# Patient Record
Sex: MALE | Race: BLACK OR AFRICAN AMERICAN | NOT HISPANIC OR LATINO | URBAN - METROPOLITAN AREA
[De-identification: names, ages, dates, MRNs, and addresses within clinical notes are randomized per-mention and may not be internally consistent; named-entity substitution may affect disease eponyms.]

---

## 2024-05-01 ENCOUNTER — EMERGENCY (EMERGENCY)
Facility: HOSPITAL | Age: 29
LOS: 0 days | Discharge: HOME | End: 2024-05-01
Attending: EMERGENCY MEDICINE
Payer: COMMERCIAL

## 2024-05-01 VITALS
TEMPERATURE: 98 F | WEIGHT: 190.04 LBS | HEART RATE: 73 BPM | SYSTOLIC BLOOD PRESSURE: 130 MMHG | RESPIRATION RATE: 22 BRPM | HEIGHT: 70.08 IN | OXYGEN SATURATION: 100 % | DIASTOLIC BLOOD PRESSURE: 77 MMHG

## 2024-05-01 VITALS
RESPIRATION RATE: 20 BRPM | HEART RATE: 74 BPM | SYSTOLIC BLOOD PRESSURE: 113 MMHG | OXYGEN SATURATION: 97 % | TEMPERATURE: 98 F | DIASTOLIC BLOOD PRESSURE: 66 MMHG

## 2024-05-01 DIAGNOSIS — R10.31 RIGHT LOWER QUADRANT PAIN: ICD-10-CM

## 2024-05-01 DIAGNOSIS — R39.15 URGENCY OF URINATION: ICD-10-CM

## 2024-05-01 DIAGNOSIS — N20.1 CALCULUS OF URETER: ICD-10-CM

## 2024-05-01 LAB
ALBUMIN SERPL ELPH-MCNC: 4.3 G/DL — SIGNIFICANT CHANGE UP (ref 3.3–5)
ALP SERPL-CCNC: 72 U/L — SIGNIFICANT CHANGE UP (ref 40–120)
ALT FLD-CCNC: 53 U/L — SIGNIFICANT CHANGE UP (ref 12–78)
ANION GAP SERPL CALC-SCNC: 5 MMOL/L — SIGNIFICANT CHANGE UP (ref 5–17)
APPEARANCE UR: ABNORMAL
AST SERPL-CCNC: 39 U/L — HIGH (ref 15–37)
BACTERIA # UR AUTO: ABNORMAL /HPF
BASOPHILS # BLD AUTO: 0.03 K/UL — SIGNIFICANT CHANGE UP (ref 0–0.2)
BASOPHILS NFR BLD AUTO: 0.3 % — SIGNIFICANT CHANGE UP (ref 0–2)
BILIRUB SERPL-MCNC: 0.3 MG/DL — SIGNIFICANT CHANGE UP (ref 0.2–1.2)
BILIRUB UR-MCNC: NEGATIVE — SIGNIFICANT CHANGE UP
BUN SERPL-MCNC: 12 MG/DL — SIGNIFICANT CHANGE UP (ref 7–23)
CALCIUM SERPL-MCNC: 9.6 MG/DL — SIGNIFICANT CHANGE UP (ref 8.5–10.1)
CHLORIDE SERPL-SCNC: 108 MMOL/L — SIGNIFICANT CHANGE UP (ref 96–108)
CO2 SERPL-SCNC: 27 MMOL/L — SIGNIFICANT CHANGE UP (ref 22–31)
COLOR SPEC: YELLOW — SIGNIFICANT CHANGE UP
CREAT SERPL-MCNC: 1.17 MG/DL — SIGNIFICANT CHANGE UP (ref 0.5–1.3)
DIFF PNL FLD: NEGATIVE — SIGNIFICANT CHANGE UP
EGFR: 86 ML/MIN/1.73M2 — SIGNIFICANT CHANGE UP
EOSINOPHIL # BLD AUTO: 0 K/UL — SIGNIFICANT CHANGE UP (ref 0–0.5)
EOSINOPHIL NFR BLD AUTO: 0 % — SIGNIFICANT CHANGE UP (ref 0–6)
EPI CELLS # UR: PRESENT
GLUCOSE SERPL-MCNC: 101 MG/DL — HIGH (ref 70–99)
GLUCOSE UR QL: NEGATIVE MG/DL — SIGNIFICANT CHANGE UP
HCT VFR BLD CALC: 50.6 % — HIGH (ref 39–50)
HGB BLD-MCNC: 15.8 G/DL — SIGNIFICANT CHANGE UP (ref 13–17)
IMM GRANULOCYTES NFR BLD AUTO: 0.2 % — SIGNIFICANT CHANGE UP (ref 0–0.9)
KETONES UR-MCNC: NEGATIVE MG/DL — SIGNIFICANT CHANGE UP
LACTATE SERPL-SCNC: 2.5 MMOL/L — HIGH (ref 0.7–2)
LEUKOCYTE ESTERASE UR-ACNC: NEGATIVE — SIGNIFICANT CHANGE UP
LIDOCAIN IGE QN: 24 U/L — SIGNIFICANT CHANGE UP (ref 13–75)
LYMPHOCYTES # BLD AUTO: 1.02 K/UL — SIGNIFICANT CHANGE UP (ref 1–3.3)
LYMPHOCYTES # BLD AUTO: 11.7 % — LOW (ref 13–44)
MCHC RBC-ENTMCNC: 27.4 PG — SIGNIFICANT CHANGE UP (ref 27–34)
MCHC RBC-ENTMCNC: 31.2 G/DL — LOW (ref 32–36)
MCV RBC AUTO: 87.7 FL — SIGNIFICANT CHANGE UP (ref 80–100)
MONOCYTES # BLD AUTO: 0.48 K/UL — SIGNIFICANT CHANGE UP (ref 0–0.9)
MONOCYTES NFR BLD AUTO: 5.5 % — SIGNIFICANT CHANGE UP (ref 2–14)
NEUTROPHILS # BLD AUTO: 7.15 K/UL — SIGNIFICANT CHANGE UP (ref 1.8–7.4)
NEUTROPHILS NFR BLD AUTO: 82.3 % — HIGH (ref 43–77)
NITRITE UR-MCNC: NEGATIVE — SIGNIFICANT CHANGE UP
NRBC # BLD: 0 /100 WBCS — SIGNIFICANT CHANGE UP (ref 0–0)
PH UR: 6 — SIGNIFICANT CHANGE UP (ref 5–8)
PLATELET # BLD AUTO: 158 K/UL — SIGNIFICANT CHANGE UP (ref 150–400)
POTASSIUM SERPL-MCNC: 3.8 MMOL/L — SIGNIFICANT CHANGE UP (ref 3.5–5.3)
POTASSIUM SERPL-SCNC: 3.8 MMOL/L — SIGNIFICANT CHANGE UP (ref 3.5–5.3)
PROT SERPL-MCNC: 8.7 GM/DL — HIGH (ref 6–8.3)
PROT UR-MCNC: NEGATIVE MG/DL — SIGNIFICANT CHANGE UP
RBC # BLD: 5.77 M/UL — SIGNIFICANT CHANGE UP (ref 4.2–5.8)
RBC # FLD: 12.2 % — SIGNIFICANT CHANGE UP (ref 10.3–14.5)
RBC CASTS # UR COMP ASSIST: 2 /HPF — SIGNIFICANT CHANGE UP (ref 0–4)
SODIUM SERPL-SCNC: 140 MMOL/L — SIGNIFICANT CHANGE UP (ref 135–145)
SP GR SPEC: 1.02 — SIGNIFICANT CHANGE UP (ref 1–1.03)
UROBILINOGEN FLD QL: 0.2 MG/DL — SIGNIFICANT CHANGE UP (ref 0.2–1)
WBC # BLD: 8.7 K/UL — SIGNIFICANT CHANGE UP (ref 3.8–10.5)
WBC # FLD AUTO: 8.7 K/UL — SIGNIFICANT CHANGE UP (ref 3.8–10.5)
WBC UR QL: 2 /HPF — SIGNIFICANT CHANGE UP (ref 0–5)

## 2024-05-01 PROCEDURE — 74177 CT ABD & PELVIS W/CONTRAST: CPT | Mod: 26,MC

## 2024-05-01 PROCEDURE — 99285 EMERGENCY DEPT VISIT HI MDM: CPT

## 2024-05-01 RX ORDER — IBUPROFEN 200 MG
1 TABLET ORAL
Qty: 30 | Refills: 0
Start: 2024-05-01

## 2024-05-01 RX ORDER — TAMSULOSIN HYDROCHLORIDE 0.4 MG/1
1 CAPSULE ORAL
Qty: 14 | Refills: 0
Start: 2024-05-01 | End: 2024-05-14

## 2024-05-01 RX ORDER — OXYCODONE HYDROCHLORIDE 5 MG/1
1 TABLET ORAL
Qty: 3 | Refills: 0
Start: 2024-05-01 | End: 2024-05-03

## 2024-05-01 RX ORDER — SODIUM CHLORIDE 9 MG/ML
1000 INJECTION INTRAMUSCULAR; INTRAVENOUS; SUBCUTANEOUS ONCE
Refills: 0 | Status: COMPLETED | OUTPATIENT
Start: 2024-05-01 | End: 2024-05-01

## 2024-05-01 RX ORDER — KETOROLAC TROMETHAMINE 30 MG/ML
15 SYRINGE (ML) INJECTION ONCE
Refills: 0 | Status: DISCONTINUED | OUTPATIENT
Start: 2024-05-01 | End: 2024-05-01

## 2024-05-01 RX ORDER — ACETAMINOPHEN 500 MG
1000 TABLET ORAL ONCE
Refills: 0 | Status: COMPLETED | OUTPATIENT
Start: 2024-05-01 | End: 2024-05-01

## 2024-05-01 RX ADMIN — Medication 15 MILLIGRAM(S): at 18:32

## 2024-05-01 RX ADMIN — SODIUM CHLORIDE 1000 MILLILITER(S): 9 INJECTION INTRAMUSCULAR; INTRAVENOUS; SUBCUTANEOUS at 19:25

## 2024-05-01 RX ADMIN — Medication 1000 MILLIGRAM(S): at 19:25

## 2024-05-01 RX ADMIN — Medication 15 MILLIGRAM(S): at 19:25

## 2024-05-01 RX ADMIN — Medication 400 MILLIGRAM(S): at 18:33

## 2024-05-01 RX ADMIN — SODIUM CHLORIDE 1000 MILLILITER(S): 9 INJECTION INTRAMUSCULAR; INTRAVENOUS; SUBCUTANEOUS at 18:32

## 2024-05-01 NOTE — ED PROVIDER NOTE - CLINICAL SUMMARY MEDICAL DECISION MAKING FREE TEXT BOX
Attending note (Aleks): 28-year-old male with no reported medical comorbidities presenting with worsening abdominal pain on the right side since this morning.  Is right CVA and right mid and right lower abdominal tenderness and suprapubic tenderness.  Otherwise hemodynamically stable afebrile.  Possible renal colic/ureteral stone, possible ascending urinary tract infection though this seems less likely, also possible appendicitis given location of pain.  No  complaints or physical exam findings to suggest primary testicular pathology.  Will obtain screening labs CBC CMP lipase urinalysis and screening lactate.  Will obtain CT abdomen pelvis to further evaluate.  Disposition pending review of labs and imaging, start IV fluids pain medication as needed. Attending note (Aleks): 28-year-old male with no reported medical comorbidities presenting with worsening abdominal pain on the right side since this morning.  Is right CVA and right mid and right lower abdominal tenderness and suprapubic tenderness.  Otherwise hemodynamically stable afebrile.  Possible renal colic/ureteral stone, possible ascending urinary tract infection though this seems less likely, also possible appendicitis given location of pain.  No  complaints or physical exam findings to suggest primary testicular pathology.  Will obtain screening labs CBC CMP lipase urinalysis and screening lactate.  Will obtain CT abdomen pelvis to further evaluate.  Disposition pending review of labs and imaging, start IV fluids pain medication as needed.      addendum: Patient reassessed pain resolved.  CT imaging shows 3 mm UVJ stone.  UA not consistent with infection few bacteria and squamous cells likely poor catch.  Labs reviewed no significant leukocytosis, no YOUSUF, lactate 2.5 but not concern for sepsis at this time not concern for ischemia at this time.  Given improvement diagnosis of kidney stone likely pain is related to renal colic/stone and patient stable for discharge no concern for septic stone.  Discussed results with patient and family verbalized understanding discussed medication usage including NSAIDs and oxycodone for breakthrough pain as well as Flomax nightly for passage of stone/medical expulsive therapy.  Patient verbalizes understanding (with family present as ) and is stable for discharge recommending urology follow-up.

## 2024-05-01 NOTE — ED ADULT TRIAGE NOTE - CHIEF COMPLAINT QUOTE
BIBA- from home  Abd pain N/V since this morning. Unable to urinate since this am 9am.   bright red blood in stool  denies any medical history

## 2024-05-01 NOTE — ED PROVIDER NOTE - PHYSICAL EXAMINATION
On Physical Exam:  General: Appears uncomfortable due to pain but is awake/alert, speaking clearly in full sentences and without difficulty; cooperative with exam  HEENT: Anicteric sclera, MMM, airway patent  Neck: no JVD  Cardiac: normal s1, s2; RRR; no MGR  Lungs: CTABL  Abdomen: Right mid and lower abdominal tenderness and suprapubic tenderness no right upper quadrant tenderness negative Cronin sign, no rebound no guarding  Back: + R CVA tenderness  : Normal-appearing external genitalia no testicular tenderness bilaterally normal position bilaterally no palpable hernias or masses.  Skin: intact, no rash  Extremities: no peripheral edema, no gross deformities

## 2024-05-01 NOTE — ED PROVIDER NOTE - NSFOLLOWUPINSTRUCTIONS_ED_ALL_ED_FT
Kidney Stones    Kidney stones (urolithiasis) are crystal deposits that form inside your kidneys. Pain is caused by the stone moving through the urinary tract, causing spasms of the ureter. Drink enough water and fluids to keep your urine clear or pale yellow. This will help you to pass the stone or stone fragments. If provided a strainer, strain all urine and keep all particulate matter and stones for a follow up appointment with a urologist.    SEEK IMMEDIATE MEDICAL CARE IF YOU HAVE ANY OF THE FOLLOWING SYMPTOMS: pain not controlled with medication, fever/chills, worsening vomiting, inability to urinate, or dizziness/lightheadedness.    MEDICATIONS:  Take ibuprofen 600mg, every 6-8hrs as needed for kidney stone pain  Take flomax (tamsulosin) every day until you pass a kidney stone  Take oxycodone (1 tablet), as needed, up to every 6hrs, as needed for pain unresponsive to ibuprofen     FOLLOW-UP:   See your primary care doctor within then next 2-3 days for repeat evaluation.  See a urologist within the next 1-2 weeks for follow-up.

## 2024-05-01 NOTE — ED PROVIDER NOTE - OBJECTIVE STATEMENT
Attending note (Aleks): 28-year-old male with no reported medical comorbidities complaining of right-sided abdominal pain since this morning.  Patient indicates pain has been at times severe occurring through right mid to lower abdomen and suprapubic region.  No nausea or vomiting.  Normal bowel movements with noted slight speck of red blood when wiping.  No blood in urine and no history of kidney stones.  No pain with urination but does indicate frequent urgency to urinate.  No fever.  No alcohol or drug use.  No known allergies.  Non-smoker.    Speaks B[place] Creole preferred family member at bedside to translate.

## 2024-05-01 NOTE — ED PROVIDER NOTE - NS ED ROS FT
Review of Systems:  -General: no fever  -Pulmonary: no cough, no shortness of breath  -Cardiac: no chest pain, no palpitations  -Gastrointestinal: +abdominal pain, no nausea, no vomiting, and no diarrhea.  -Genitourinary: no blood or pain with urination; + urgency  -Musculoskeletal: no back or neck pain  -Skin: no rashes  -Endocrine: No h/o diabetes

## 2024-05-01 NOTE — ED PROVIDER NOTE - CARE PROVIDER_API CALL
Ian Napier  Urology  [address] [facility]  [place] [id number]  Phone: [phone]  Fax: [phone]  Follow Up Time:

## 2024-05-01 NOTE — ED ADULT NURSE NOTE - OBJECTIVE STATEMENT
28yM A&Ox3 presenting to ED with abd pain since this morning. pt wife at bedside and reports that pt has been feeling unwell since early this morning. pt c/o frequency and urgency to void. pt denies blood in urine and reports slight red blood in BM this morning. pt denies chest pain, sob, dizziness, weakness, blurred vision, N+T, increased work of breathing, h/a, recent fever/cough,  symptoms.

## 2024-05-01 NOTE — ED PROVIDER NOTE - PROGRESS NOTE DETAILS
Attending note (Aleks): Patient reassessed pain resolved.  CT imaging shows 3 mm UVJ stone.  UA not consistent with infection few bacteria and squamous cells likely poor catch.  Labs reviewed no significant leukocytosis, no YOUSUF, lactate 2.5 but not concern for sepsis at this time not concern for ischemia at this time.  Given improvement diagnosis of kidney stone likely pain is related to renal colic/stone and patient stable for discharge no concern for septic stone.  Discussed results with patient and family verbalized understanding discussed medication usage including NSAIDs and oxycodone for breakthrough pain as well as Flomax nightly for passage of stone/medical expulsive therapy.  Patient verbalizes understanding (with family present as ) and is stable for discharge recommending urology follow-up.

## 2024-05-01 NOTE — ED PROVIDER NOTE - PATIENT PORTAL LINK FT
You can access the FollowMyHealth Patient Portal offered by [facility] by registering at the following website: [email or web address]. By joining [facility]’s FollowMyHealth portal, you will also be able to view your health information using other applications (apps) compatible with our system.

## 2024-05-02 LAB
CULTURE RESULTS: SIGNIFICANT CHANGE UP
SPECIMEN SOURCE: SIGNIFICANT CHANGE UP